# Patient Record
Sex: FEMALE | Race: OTHER
[De-identification: names, ages, dates, MRNs, and addresses within clinical notes are randomized per-mention and may not be internally consistent; named-entity substitution may affect disease eponyms.]

---

## 2017-01-05 ENCOUNTER — HOSPITAL ENCOUNTER (OUTPATIENT)
Dept: HOSPITAL 47 - LABWHC1 | Age: 62
Discharge: HOME | End: 2017-01-05
Payer: SELF-PAY

## 2017-01-05 DIAGNOSIS — R94.5: ICD-10-CM

## 2017-01-05 DIAGNOSIS — D56.9: ICD-10-CM

## 2017-01-05 DIAGNOSIS — E03.9: Primary | ICD-10-CM

## 2017-01-05 DIAGNOSIS — D50.8: ICD-10-CM

## 2017-01-05 LAB
IRON SERPL-MCNC: 139 UG/DL (ref 37–170)
TIBC SERPL-MCNC: 279 UG/DL (ref 265–497)

## 2017-01-05 PROCEDURE — 84439 ASSAY OF FREE THYROXINE: CPT

## 2017-01-05 PROCEDURE — 36415 COLL VENOUS BLD VENIPUNCTURE: CPT

## 2017-01-05 PROCEDURE — 83540 ASSAY OF IRON: CPT

## 2017-01-05 PROCEDURE — 83550 IRON BINDING TEST: CPT

## 2017-01-05 PROCEDURE — 82306 VITAMIN D 25 HYDROXY: CPT

## 2017-01-05 PROCEDURE — 84443 ASSAY THYROID STIM HORMONE: CPT

## 2017-01-05 PROCEDURE — 83021 HEMOGLOBIN CHROMOTOGRAPHY: CPT

## 2017-01-05 PROCEDURE — 82728 ASSAY OF FERRITIN: CPT

## 2017-02-15 ENCOUNTER — HOSPITAL ENCOUNTER (OUTPATIENT)
Dept: HOSPITAL 47 - LAB | Age: 62
Discharge: HOME | End: 2017-02-15
Payer: SELF-PAY

## 2017-02-15 DIAGNOSIS — E03.9: Primary | ICD-10-CM

## 2017-02-15 DIAGNOSIS — K21.9: ICD-10-CM

## 2017-02-15 LAB
CHOLEST SERPL-MCNC: 163 MG/DL (ref ?–200)
HDLC SERPL-MCNC: 40 MG/DL (ref 40–60)
TRIGL SERPL-MCNC: 196 MG/DL (ref ?–150)

## 2017-02-15 PROCEDURE — 86376 MICROSOMAL ANTIBODY EACH: CPT

## 2017-02-15 PROCEDURE — 80061 LIPID PANEL: CPT

## 2017-02-15 PROCEDURE — 84443 ASSAY THYROID STIM HORMONE: CPT

## 2017-02-15 PROCEDURE — 82306 VITAMIN D 25 HYDROXY: CPT

## 2017-02-15 PROCEDURE — 84439 ASSAY OF FREE THYROXINE: CPT

## 2017-02-15 PROCEDURE — 86800 THYROGLOBULIN ANTIBODY: CPT

## 2017-08-16 ENCOUNTER — HOSPITAL ENCOUNTER (EMERGENCY)
Dept: HOSPITAL 47 - EC | Age: 62
Discharge: HOME | End: 2017-08-16
Payer: COMMERCIAL

## 2017-08-16 VITALS
HEART RATE: 68 BPM | SYSTOLIC BLOOD PRESSURE: 130 MMHG | DIASTOLIC BLOOD PRESSURE: 76 MMHG | TEMPERATURE: 97.3 F | RESPIRATION RATE: 20 BRPM

## 2017-08-16 DIAGNOSIS — H60.91: Primary | ICD-10-CM

## 2017-08-16 DIAGNOSIS — R68.84: ICD-10-CM

## 2017-08-16 DIAGNOSIS — R51: ICD-10-CM

## 2017-08-16 PROCEDURE — 99282 EMERGENCY DEPT VISIT SF MDM: CPT

## 2017-08-16 NOTE — ED
ENT HPI





- General


Chief complaint: ENT


Stated complaint: ear pain


Time Seen by Provider: 08/16/17 19:50


Source: patient, family


Mode of arrival: ambulatory


Limitations: no limitations





- History of Present Illness


Initial comments: 


61 year-old female patient presents emergency department today for complaints 

of right ear pain.  Patient does have a language barrier and has a family 

 with her.  She agrees to let the family member interpret for her 

and denies need for phone translation at this time.   states that 

she has had right ear pain for the last 2 days.  Patient states that the pain 

is now radiating down into her jaw and into her head.  She denies any drainage 

from the ear.  Denies any decrease in hearing.  She denies any fever, chills, 

rash, cough, congestion, nasal congestion, or nasal drainage.  She denies any 

sore throat, chest pain, shortness of breath, dizziness, weakness, headache, 

abdominal pain, nausea, vomiting, or any difficulties with urination or bowel 

movements.








- Related Data


 Home Medications











 Medication  Instructions  Recorded  Confirmed


 


Naproxen Sodium [Aleve] 220 - 440 mg PO Q8H PRN 10/25/16 08/16/17








 Previous Rx's











 Medication  Instructions  Recorded


 


Hydrocodone/Acetaminophen [Norco 1 each PO Q6HR PRN #20 tab 10/25/16





5-325]  


 


Ibuprofen [Motrin] 600 mg PO Q6HR PRN #40 day 10/25/16


 


Amoxic-Pot Clav 875-125Mg 1 tab PO Q12HR #20 tablet 08/16/17





[Augmentin 875-125]  


 


Ciprofloxacin-Dexameth [Ciprodex 4 drops RIGHT EAR BID #15 ml 08/16/17





Otic Susp]  


 


Ibuprofen 800 mg PO TID PRN #30 tablet 08/16/17











 Allergies











Allergy/AdvReac Type Severity Reaction Status Date / Time


 


No Known Allergies Allergy   Verified 12/31/16 13:52














Review of Systems


ROS Statement: 


Those systems with pertinent positive or pertinent negative responses have been 

documented in the HPI.





ROS Other: All systems not noted in ROS Statement are negative.





Past Medical History


Past Medical History: Thyroid Disorder


Additional Past Medical History / Comment(s): STEROID INJECTION WITHIN 3 MOS,

POSTMENOPAUSAL>2 YRS.  pt does not speak english. obtained history from brother-

broken english


History of Any Multi-Drug Resistant Organisms: None Reported


Past Surgical History: Cholecystectomy


Past Anesthesia/Blood Transfusion Reactions: No Reported Reaction


Past Psychological History: No Psychological Hx Reported


Smoking Status: Never smoker


Past Alcohol Use History: None Reported


Past Drug Use History: None Reported





- Past Family History


  ** Mother


Family Medical History: No Reported History





  ** Father


Family Medical History: Diabetes Mellitus





General Exam


Limitations: no limitations


General appearance: alert, in no apparent distress


Head exam: Present: atraumatic, normocephalic, normal inspection


Eye exam: Present: normal appearance, PERRL, EOMI.  Absent: scleral icterus, 

conjunctival injection, periorbital swelling


ENT exam: Present: normal exam, normal oropharynx, mucous membranes moist, 

other (Right external auditory canal is erythematous, swollen, and tender to 

touch.  No drainage noted.  No occlusion.  One postauricular lymph node 

appreciated. No mastoid tenderness.).  Absent: TM's normal bilaterally (Left 

tympanic membrane is within normal limits.  Unable to visualize the right 

tympanic membrane due to soft wax occlusion.), normal external ear exam (

Patient has some erythema and swelling noted to the right tragus.)


Neck exam: Present: normal inspection.  Absent: tenderness, meningismus, 

lymphadenopathy


Respiratory exam: Present: normal lung sounds bilaterally.  Absent: respiratory 

distress, wheezes, rales, rhonchi, stridor


Cardiovascular Exam: Present: regular rate, normal rhythm, normal heart sounds.

  Absent: systolic murmur, diastolic murmur, rubs, gallop, clicks


GI/Abdominal exam: Present: soft, normal bowel sounds.  Absent: distended, 

tenderness, guarding, rebound, rigid


Neurological exam: Present: alert, oriented X3, CN II-XII intact


Psychiatric exam: Present: normal affect, normal mood


Skin exam: Present: warm, dry, intact, normal color.  Absent: rash





Course


 Vital Signs











  08/16/17





  19:48


 


Temperature 97.3 F L


 


Pulse Rate 68


 


Respiratory 20





Rate 


 


Blood Pressure 130/76


 


O2 Sat by Pulse 99





Oximetry 














Medical Decision Making





- Medical Decision Making


61 year-old female patient presented to emergency department today for 

evaluation of right ear pain.  Physical exam did reveal erythema, swelling, and 

tenderness to the right external auditory canal as well as the right tragus.  

Patient will be discharged with a prescription for Ciprodex drops to be 

instilled twice daily, as well as a perception of Augmentin as I was unable to 

visualize the right tympanic membrane.  Patient was given instructions to follow

-up with ears nose and throat specialist in 1-2 days for recheck.  Instructed 

to follow-up with her primary care physician for recheck in 1-2 days.  

Instructed to return here for any new, worsening, or concerning symptoms.  

Patient via the  verbalized understanding and agreed to this plan.








Disposition


Clinical Impression: 


 Otitis externa, Ear pain, right





Disposition: HOME SELF-CARE


Condition: Good


Instructions:  Otitis Externa (ED), Earache (ED)


Additional Instructions: 


Make appointmentn with ears nose and throat specialist.  Take medications as 

directed.  Follow-up with primary care physician for recheck in 1-2 days.  

Return immediately for any new, worsening, or concerning symptoms.


Prescriptions: 


Amoxic-Pot Clav 875-125Mg [Augmentin 875-125] 1 tab PO Q12HR #20 tablet


Ciprofloxacin-Dexameth [Ciprodex Otic Susp] 4 drops RIGHT EAR BID #15 ml


Ibuprofen 800 mg PO TID PRN #30 tablet


 PRN Reason: Pain


Referrals: 


Christian Art MD [Primary Care Provider] - 1-2 days


Kedar Murillo MD [STAFF PHYSICIAN] - 1-2 days


Time of Disposition: 20:30

## 2017-10-12 ENCOUNTER — HOSPITAL ENCOUNTER (EMERGENCY)
Dept: HOSPITAL 47 - EC | Age: 62
Discharge: HOME | End: 2017-10-12
Payer: COMMERCIAL

## 2017-10-12 VITALS
SYSTOLIC BLOOD PRESSURE: 111 MMHG | DIASTOLIC BLOOD PRESSURE: 77 MMHG | TEMPERATURE: 99.3 F | RESPIRATION RATE: 18 BRPM | HEART RATE: 66 BPM

## 2017-10-12 DIAGNOSIS — K08.89: Primary | ICD-10-CM

## 2017-10-12 PROCEDURE — 99282 EMERGENCY DEPT VISIT SF MDM: CPT

## 2017-10-12 NOTE — ED
ENT HPI





- General


Chief complaint: Dental/Oral


Stated complaint: Dental


Time Seen by Provider: 10/12/17 12:12


Source: patient


Mode of arrival: wheelchair


Limitations: language barrier





- History of Present Illness


Initial comments: 


This is a 62-year-old female who presents to the emergency department with 

chief complaint of dental pain.  Patient does not speak English.  There is a 

family member at the bedside who provides history and translates.  Family 

member states that last week patient saw a dentist in [place] and had tooth #10 

removed.  This week patient has been complaining of pain at tooth #11. The pain 

is localized. Pain is constant ache and becomes sharp with touch, air and cold 

drinks. Denies drainage or discharge.  Denies fever, chills, chest pain, 

shortness of breath, abdominal pain, nausea or vomiting, headache or vision 

changes.  








- Related Data


 Home Medications











 Medication  Instructions  Recorded  Confirmed


 


Naproxen Sodium [Aleve] 220 - 440 mg PO Q8H PRN 10/25/16 08/16/17








 Previous Rx's











 Medication  Instructions  Recorded


 


Hydrocodone/Acetaminophen [Norco 1 each PO Q6HR PRN #20 tab 10/25/16





5-325]  


 


Ibuprofen [Motrin] 600 mg PO Q6HR PRN #40 day 10/25/16


 


Amoxic-Pot Clav 875-125Mg 1 tab PO Q12HR #20 tablet 08/16/17





[Augmentin 875-125]  


 


Ciprofloxacin-Dexameth [Ciprodex 4 drops RIGHT EAR BID #15 ml 08/16/17





Otic Susp]  


 


Ibuprofen 800 mg PO TID PRN #30 tablet 08/16/17


 


HYDROcodone/APAP 5-325MG [Norco 5] 1 each PO Q6HR PRN #20 tab 10/12/17


 


Ibuprofen [Motrin] 600 mg PO Q6HR PRN #30 tab 10/12/17


 


Penicillin V Potassium 500 mg PO QID 10 Days 10/12/17











 Allergies











Allergy/AdvReac Type Severity Reaction Status Date / Time


 


No Known Allergies Allergy   Verified 12/31/16 13:52














Review of Systems


ROS Statement: 


Those systems with pertinent positive or pertinent negative responses have been 

documented in the HPI.





ROS Other: All systems not noted in ROS Statement are negative.





Past Medical History


Past Medical History: Thyroid Disorder


Additional Past Medical History / Comment(s): STEROID INJECTION WITHIN 3 MOS,

POSTMENOPAUSAL>2 YRS.  pt does not speak english. obtained history from brother-

broken english


History of Any Multi-Drug Resistant Organisms: None Reported


Past Surgical History: Cholecystectomy


Past Anesthesia/Blood Transfusion Reactions: No Reported Reaction


Past Psychological History: No Psychological Hx Reported


Smoking Status: Never smoker


Past Alcohol Use History: None Reported


Past Drug Use History: None Reported





- Past Family History


  ** Mother


Family Medical History: No Reported History





  ** Father


Family Medical History: Diabetes Mellitus





General Exam





- General Exam Comments


Initial Comments: 





General: Awake and alert, well-developed; appears uncomfortable.  Family 

members at bedside.


HEENT: Head atraumatic, normocephalic. Pupils are equal, round and reactive to 

light. Extraocular movements intact. Oropharynx moist without erythema or 

exudate.  Dentition is poor throughout.  Tooth #10 has been extracted.  Gumline 

of tooth #11 is erythematous and tender to touch.  It appears she has 

periodontal disease as the gumline is recessed.  No absence or fluctuance noted.


Neck: Supple. Normal ROM. 


Cardiovascular: Regular rate and rhythm. No murmurs, rubs or gallops. Chest 

symmetrical.  


Respiratory: Lungs clear to auscultation bilaterally. No wheezes, rales or 

rhonchi. Normal respiratory effort with no use of accessory muscles. 


Skin: Pink, warm and dry without rashes or lesions. 


Neurological: Alert and oriented x3.. CN II-XII grossly intact. No focal neuro 

deficits. 


Psychiatric: Normal mood and affect. No overt signs of depression or anxiety 

noted. 











Limitations: language barrier





Course


 Vital Signs











  10/12/17





  11:43


 


Temperature 99.3 F


 


Pulse Rate 66


 


Respiratory 18





Rate 


 


Blood Pressure 111/77


 


O2 Sat by Pulse 97





Oximetry 














Medical Decision Making





- Medical Decision Making





This case was discussed with attending physician, Dr. Benitez.  Patient will be 

discharged home with prescription for antibiotic and pain medications.  She was 

advised to follow up with a dentist as soon as possible.





Disposition


Clinical Impression: 


 Pain, dental





Disposition: HOME SELF-CARE


Condition: Good


Instructions:  Toothache (ED), Dental Caries (ED)


Additional Instructions: 


Please take medications as prescribed. Please follow up with a dentist as soon 

as possible.  Return to emergency department if symptoms should worsen or any 

concerns arise. 


Prescriptions: 


HYDROcodone/APAP 5-325MG [Norco 5] 1 each PO Q6HR PRN #20 tab


 PRN Reason: Pain


Ibuprofen [Motrin] 600 mg PO Q6HR PRN #30 tab


 PRN Reason: Pain


Penicillin V Potassium 500 mg PO QID 10 Days


Referrals: 


Christian Art MD [Primary Care Provider] - 1-2 days


Time of Disposition: 12:33

## 2018-01-12 ENCOUNTER — HOSPITAL ENCOUNTER (OUTPATIENT)
Dept: HOSPITAL 47 - LABWHC1 | Age: 63
Discharge: HOME | End: 2018-01-12
Payer: COMMERCIAL

## 2018-01-12 DIAGNOSIS — E55.9: ICD-10-CM

## 2018-01-12 DIAGNOSIS — E06.3: ICD-10-CM

## 2018-01-12 DIAGNOSIS — K75.9: ICD-10-CM

## 2018-01-12 DIAGNOSIS — E78.5: Primary | ICD-10-CM

## 2018-01-12 DIAGNOSIS — E03.9: ICD-10-CM

## 2018-01-12 LAB
ALBUMIN SERPL-MCNC: 4.5 G/DL (ref 3.5–5)
ALP SERPL-CCNC: 78 U/L (ref 38–126)
ALT SERPL-CCNC: 54 U/L (ref 9–52)
ANION GAP SERPL CALC-SCNC: 11 MMOL/L
AST SERPL-CCNC: 35 U/L (ref 14–36)
BASOPHILS # BLD AUTO: 0 K/UL (ref 0–0.2)
BASOPHILS NFR BLD AUTO: 1 %
BUN SERPL-SCNC: 17 MG/DL (ref 7–17)
CALCIUM SPEC-MCNC: 9.7 MG/DL (ref 8.4–10.2)
CHLORIDE SERPL-SCNC: 106 MMOL/L (ref 98–107)
CHOLEST SERPL-MCNC: 197 MG/DL (ref ?–200)
CK SERPL-CCNC: 44 U/L (ref 30–135)
CO2 SERPL-SCNC: 25 MMOL/L (ref 22–30)
EOSINOPHIL # BLD AUTO: 0.1 K/UL (ref 0–0.7)
EOSINOPHIL NFR BLD AUTO: 3 %
ERYTHROCYTE [DISTWIDTH] IN BLOOD BY AUTOMATED COUNT: 5.23 M/UL (ref 3.8–5.4)
ERYTHROCYTE [DISTWIDTH] IN BLOOD: 13.7 % (ref 11.5–15.5)
GLUCOSE SERPL-MCNC: 117 MG/DL (ref 74–99)
HBA1C MFR BLD: 6.1 % (ref 4–6)
HCT VFR BLD AUTO: 40.3 % (ref 34–46)
HDLC SERPL-MCNC: 48 MG/DL (ref 40–60)
HGB BLD-MCNC: 12 GM/DL (ref 11.4–16)
LDLC SERPL CALC-MCNC: 128 MG/DL (ref 0–99)
LYMPHOCYTES # SPEC AUTO: 1.7 K/UL (ref 1–4.8)
LYMPHOCYTES NFR SPEC AUTO: 44 %
MCH RBC QN AUTO: 22.8 PG (ref 25–35)
MCHC RBC AUTO-ENTMCNC: 29.6 G/DL (ref 31–37)
MCV RBC AUTO: 77.1 FL (ref 80–100)
MONOCYTES # BLD AUTO: 0.2 K/UL (ref 0–1)
MONOCYTES NFR BLD AUTO: 6 %
NEUTROPHILS # BLD AUTO: 1.7 K/UL (ref 1.3–7.7)
NEUTROPHILS NFR BLD AUTO: 44 %
PLATELET # BLD AUTO: 250 K/UL (ref 150–450)
POTASSIUM SERPL-SCNC: 4.5 MMOL/L (ref 3.5–5.1)
PROT SERPL-MCNC: 8.5 G/DL (ref 6.3–8.2)
SODIUM SERPL-SCNC: 142 MMOL/L (ref 137–145)
T4 FREE SERPL-MCNC: 1.28 NG/DL (ref 0.78–2.19)
TRIGL SERPL-MCNC: 105 MG/DL (ref ?–150)
WBC # BLD AUTO: 3.8 K/UL (ref 3.8–10.6)

## 2018-01-12 PROCEDURE — 86140 C-REACTIVE PROTEIN: CPT

## 2018-01-12 PROCEDURE — 83036 HEMOGLOBIN GLYCOSYLATED A1C: CPT

## 2018-01-12 PROCEDURE — 36415 COLL VENOUS BLD VENIPUNCTURE: CPT

## 2018-01-12 PROCEDURE — 84439 ASSAY OF FREE THYROXINE: CPT

## 2018-01-12 PROCEDURE — 86376 MICROSOMAL ANTIBODY EACH: CPT

## 2018-01-12 PROCEDURE — 82306 VITAMIN D 25 HYDROXY: CPT

## 2018-01-12 PROCEDURE — 85025 COMPLETE CBC W/AUTO DIFF WBC: CPT

## 2018-01-12 PROCEDURE — 85652 RBC SED RATE AUTOMATED: CPT

## 2018-01-12 PROCEDURE — 80074 ACUTE HEPATITIS PANEL: CPT

## 2018-01-12 PROCEDURE — 80053 COMPREHEN METABOLIC PANEL: CPT

## 2018-01-12 PROCEDURE — 82550 ASSAY OF CK (CPK): CPT

## 2018-01-12 PROCEDURE — 84443 ASSAY THYROID STIM HORMONE: CPT

## 2018-01-12 PROCEDURE — 86800 THYROGLOBULIN ANTIBODY: CPT

## 2018-01-12 PROCEDURE — 80061 LIPID PANEL: CPT

## 2018-03-12 ENCOUNTER — HOSPITAL ENCOUNTER (EMERGENCY)
Dept: HOSPITAL 47 - EC | Age: 63
Discharge: HOME | End: 2018-03-12
Payer: COMMERCIAL

## 2018-03-12 VITALS
RESPIRATION RATE: 17 BRPM | TEMPERATURE: 97.7 F | DIASTOLIC BLOOD PRESSURE: 70 MMHG | SYSTOLIC BLOOD PRESSURE: 119 MMHG | HEART RATE: 74 BPM

## 2018-03-12 DIAGNOSIS — Z90.49: ICD-10-CM

## 2018-03-12 DIAGNOSIS — R50.9: ICD-10-CM

## 2018-03-12 DIAGNOSIS — Z79.899: ICD-10-CM

## 2018-03-12 DIAGNOSIS — K52.9: Primary | ICD-10-CM

## 2018-03-12 DIAGNOSIS — E07.9: ICD-10-CM

## 2018-03-12 LAB
ALBUMIN SERPL-MCNC: 4.4 G/DL (ref 3.5–5)
ALP SERPL-CCNC: 68 U/L (ref 38–126)
ALT SERPL-CCNC: 42 U/L (ref 9–52)
AMYLASE SERPL-CCNC: 72 U/L (ref 30–110)
ANION GAP SERPL CALC-SCNC: 11 MMOL/L
APTT BLD: 22.1 SEC (ref 22–30)
AST SERPL-CCNC: 27 U/L (ref 14–36)
BASOPHILS # BLD AUTO: 0 K/UL (ref 0–0.2)
BASOPHILS NFR BLD AUTO: 0 %
BUN SERPL-SCNC: 15 MG/DL (ref 7–17)
CALCIUM SPEC-MCNC: 9.6 MG/DL (ref 8.4–10.2)
CHLORIDE SERPL-SCNC: 105 MMOL/L (ref 98–107)
CO2 SERPL-SCNC: 26 MMOL/L (ref 22–30)
EOSINOPHIL # BLD AUTO: 0 K/UL (ref 0–0.7)
EOSINOPHIL NFR BLD AUTO: 1 %
ERYTHROCYTE [DISTWIDTH] IN BLOOD BY AUTOMATED COUNT: 5.33 M/UL (ref 3.8–5.4)
ERYTHROCYTE [DISTWIDTH] IN BLOOD: 13.9 % (ref 11.5–15.5)
GLUCOSE SERPL-MCNC: 93 MG/DL (ref 74–99)
HCT VFR BLD AUTO: 39.5 % (ref 34–46)
HGB BLD-MCNC: 12.2 GM/DL (ref 11.4–16)
INR PPP: 1 (ref ?–1.2)
LIPASE SERPL-CCNC: 121 U/L (ref 23–300)
LYMPHOCYTES # SPEC AUTO: 1.7 K/UL (ref 1–4.8)
LYMPHOCYTES NFR SPEC AUTO: 34 %
MCH RBC QN AUTO: 22.9 PG (ref 25–35)
MCHC RBC AUTO-ENTMCNC: 30.9 G/DL (ref 31–37)
MCV RBC AUTO: 74.2 FL (ref 80–100)
MONOCYTES # BLD AUTO: 0.3 K/UL (ref 0–1)
MONOCYTES NFR BLD AUTO: 6 %
NEUTROPHILS # BLD AUTO: 2.7 K/UL (ref 1.3–7.7)
NEUTROPHILS NFR BLD AUTO: 56 %
PH UR: 7 [PH] (ref 5–8)
PLATELET # BLD AUTO: 279 K/UL (ref 150–450)
POTASSIUM SERPL-SCNC: 4.7 MMOL/L (ref 3.5–5.1)
PROT SERPL-MCNC: 8.3 G/DL (ref 6.3–8.2)
PT BLD: 9.7 SEC (ref 9–12)
SODIUM SERPL-SCNC: 142 MMOL/L (ref 137–145)
SP GR UR: 1.02 (ref 1–1.03)
UROBILINOGEN UR QL STRIP: <2 MG/DL (ref ?–2)
WBC # BLD AUTO: 4.9 K/UL (ref 3.8–10.6)

## 2018-03-12 PROCEDURE — 83690 ASSAY OF LIPASE: CPT

## 2018-03-12 PROCEDURE — 85730 THROMBOPLASTIN TIME PARTIAL: CPT

## 2018-03-12 PROCEDURE — 85610 PROTHROMBIN TIME: CPT

## 2018-03-12 PROCEDURE — 80053 COMPREHEN METABOLIC PANEL: CPT

## 2018-03-12 PROCEDURE — 82150 ASSAY OF AMYLASE: CPT

## 2018-03-12 PROCEDURE — 96361 HYDRATE IV INFUSION ADD-ON: CPT

## 2018-03-12 PROCEDURE — 99284 EMERGENCY DEPT VISIT MOD MDM: CPT

## 2018-03-12 PROCEDURE — 87502 INFLUENZA DNA AMP PROBE: CPT

## 2018-03-12 PROCEDURE — 74177 CT ABD & PELVIS W/CONTRAST: CPT

## 2018-03-12 PROCEDURE — 96375 TX/PRO/DX INJ NEW DRUG ADDON: CPT

## 2018-03-12 PROCEDURE — 81003 URINALYSIS AUTO W/O SCOPE: CPT

## 2018-03-12 PROCEDURE — 96374 THER/PROPH/DIAG INJ IV PUSH: CPT

## 2018-03-12 PROCEDURE — 85025 COMPLETE CBC W/AUTO DIFF WBC: CPT

## 2018-03-12 PROCEDURE — 87040 BLOOD CULTURE FOR BACTERIA: CPT

## 2018-03-12 PROCEDURE — 36415 COLL VENOUS BLD VENIPUNCTURE: CPT

## 2018-03-12 PROCEDURE — 83605 ASSAY OF LACTIC ACID: CPT

## 2018-03-12 NOTE — ED
Abdominal Pain HPI





- General


Chief Complaint: Abdominal Pain


Stated Complaint: Abd Pain, Headache


Time Seen by Provider: 03/12/18 13:30


Source: patient, family, RN notes reviewed


Mode of arrival: ambulatory


Limitations: language barrier





- History of Present Illness


Initial Comments: 





This is a 62-year-old female presents emergency Department with chief complaint 

of abdominal pain for last 3 days.  Patient went of epigastric discomfort to 

midabdominal pain.  She denies chest pain, shortness breath, diarrhea 

constipation.  She's had a prior cholecystectomy.  Patient states that she has 

a headache but seems to come and go.  Denies any dizziness, ear pain, sore 

throat no cough or chest congestion.  Patient states the pain in her abdomen 

only radiates down does radiate to her back.  She has no dysuria no hematuria.





- Related Data


 Home Medications











 Medication  Instructions  Recorded  Confirmed


 


Levothyroxine Sodium 100 mcg PO DAILY 03/12/18 03/12/18








 Previous Rx's











 Medication  Instructions  Recorded


 


Ondansetron Odt [Zofran Odt] 4 mg PO Q8HR PRN #10 tab 03/12/18


 


traMADol HCl [Ultram] 50 mg PO Q6H PRN #20 tab 03/12/18











 Allergies











Allergy/AdvReac Type Severity Reaction Status Date / Time


 


No Known Allergies Allergy   Verified 03/12/18 13:36














Review of Systems


ROS Statement: 


Those systems with pertinent positive or pertinent negative responses have been 

documented in the HPI.





ROS Other: All systems not noted in ROS Statement are negative.





Past Medical History


Past Medical History: Thyroid Disorder


Additional Past Medical History / Comment(s): STEROID INJECTION WITHIN 3 MOS,

POSTMENOPAUSAL>2 YRS.  pt does not speak english. obtained history from brother-

broken english


History of Any Multi-Drug Resistant Organisms: None Reported


Past Surgical History: Cholecystectomy


Past Anesthesia/Blood Transfusion Reactions: No Reported Reaction


Past Psychological History: No Psychological Hx Reported


Smoking Status: Never smoker


Past Alcohol Use History: None Reported


Past Drug Use History: None Reported





- Past Family History


  ** Mother


Family Medical History: No Reported History





  ** Father


Family Medical History: Diabetes Mellitus





General Exam


Limitations: language barrier


General appearance: alert, in no apparent distress


Head exam: Present: atraumatic, normocephalic, normal inspection


Eye exam: Present: normal appearance, PERRL, EOMI.  Absent: scleral icterus, 

conjunctival injection, periorbital swelling


ENT exam: Present: normal exam, normal oropharynx, mucous membranes moist, TM's 

normal bilaterally, normal external ear exam


Neck exam: Present: normal inspection, full ROM.  Absent: tenderness, 

meningismus, lymphadenopathy


Respiratory exam: Present: normal lung sounds bilaterally.  Absent: respiratory 

distress, wheezes, rales, rhonchi, stridor


Cardiovascular Exam: Present: regular rate, normal rhythm, normal heart sounds.

  Absent: systolic murmur, diastolic murmur, rubs, gallop, clicks


GI/Abdominal exam: Present: soft, tenderness (Mild epigastric), normal bowel 

sounds.  Absent: distended, guarding, rebound, rigid


Back exam: Absent: CVA tenderness (R), CVA tenderness (L)


Skin exam: Present: warm, dry, intact, normal color.  Absent: rash





Course


 Vital Signs











  03/12/18





  13:00


 


Temperature 100.1 F H


 


Pulse Rate 82


 


Respiratory 18





Rate 


 


Blood Pressure 109/71


 


O2 Sat by Pulse 96





Oximetry 














- Reevaluation(s)


Reevaluation #1: 





03/12/18 15:11


Patient was updated and results stated that her headache is resolved though she 

still had some abdominal discomfort.  Patient we given Toradol.





Medical Decision Making





- Medical Decision Making





62-year-old female presented emergency department with multiple complaints.  

Primary complaint was abdominal pain.  Patient lab work and CT secondary to 

fever.  Patient has enteritis type changes.  Patient did complain of a headache 

though resolved after acetaminophen and IV fluids.  Patient no neck pain or 

neck stiffness.  Patient was updated on lab results which essentially 

unremarkable.  Patient will be discharged with  antiemetics and tramadol





- Lab Data


Result diagrams: 


 03/12/18 13:43





 03/12/18 13:43


 Lab Results











  03/12/18 03/12/18 03/12/18 Range/Units





  13:00 13:43 13:43 


 


WBC    4.9  (3.8-10.6)  k/uL


 


RBC    5.33  (3.80-5.40)  m/uL


 


Hgb    12.2  (11.4-16.0)  gm/dL


 


Hct    39.5  (34.0-46.0)  %


 


MCV    74.2 L  (80.0-100.0)  fL


 


MCH    22.9 L  (25.0-35.0)  pg


 


MCHC    30.9 L  (31.0-37.0)  g/dL


 


RDW    13.9  (11.5-15.5)  %


 


Plt Count    279  (150-450)  k/uL


 


Neutrophils %    56  %


 


Lymphocytes %    34  %


 


Monocytes %    6  %


 


Eosinophils %    1  %


 


Basophils %    0  %


 


Neutrophils #    2.7  (1.3-7.7)  k/uL


 


Lymphocytes #    1.7  (1.0-4.8)  k/uL


 


Monocytes #    0.3  (0-1.0)  k/uL


 


Eosinophils #    0.0  (0-0.7)  k/uL


 


Basophils #    0.0  (0-0.2)  k/uL


 


Hypochromasia    Slight  


 


Microcytosis    Slight  


 


PT     (9.0-12.0)  sec


 


INR     (<1.2)  


 


APTT     (22.0-30.0)  sec


 


Sodium   142   (137-145)  mmol/L


 


Potassium   4.7   (3.5-5.1)  mmol/L


 


Chloride   105   ()  mmol/L


 


Carbon Dioxide   26   (22-30)  mmol/L


 


Anion Gap   11   mmol/L


 


BUN   15   (7-17)  mg/dL


 


Creatinine   0.60   (0.52-1.04)  mg/dL


 


Est GFR (CKD-EPI)AfAm   >90   (>60 ml/min/1.73 sqM)  


 


Est GFR (CKD-EPI)NonAf   >90   (>60 ml/min/1.73 sqM)  


 


Glucose   93   (74-99)  mg/dL


 


Plasma Lactic Acid Fred     (0.7-2.0)  mmol/L


 


Calcium   9.6   (8.4-10.2)  mg/dL


 


Total Bilirubin   0.2   (0.2-1.3)  mg/dL


 


AST   27   (14-36)  U/L


 


ALT   42   (9-52)  U/L


 


Alkaline Phosphatase   68   ()  U/L


 


Total Protein   8.3 H   (6.3-8.2)  g/dL


 


Albumin   4.4   (3.5-5.0)  g/dL


 


Amylase   72   ()  U/L


 


Lipase   121   ()  U/L


 


Urine Color  Yellow    


 


Urine Appearance  Clear    (Clear)  


 


Urine pH  7.0    (5.0-8.0)  


 


Ur Specific Gravity  1.020    (1.001-1.035)  


 


Urine Protein  Trace H    (Negative)  


 


Urine Glucose (UA)  Negative    (Negative)  


 


Urine Ketones  Negative    (Negative)  


 


Urine Blood  Negative    (Negative)  


 


Urine Nitrite  Negative    (Negative)  


 


Urine Bilirubin  Negative    (Negative)  


 


Urine Urobilinogen  <2.0    (<2.0)  mg/dL


 


Ur Leukocyte Esterase  Negative    (Negative)  


 


Influenza Type A RNA     (Not Detectd)  


 


Influenza Type B (PCR)     (Not Detectd)  














  03/12/18 03/12/18 03/12/18 Range/Units





  13:43 13:43 13:43 


 


WBC     (3.8-10.6)  k/uL


 


RBC     (3.80-5.40)  m/uL


 


Hgb     (11.4-16.0)  gm/dL


 


Hct     (34.0-46.0)  %


 


MCV     (80.0-100.0)  fL


 


MCH     (25.0-35.0)  pg


 


MCHC     (31.0-37.0)  g/dL


 


RDW     (11.5-15.5)  %


 


Plt Count     (150-450)  k/uL


 


Neutrophils %     %


 


Lymphocytes %     %


 


Monocytes %     %


 


Eosinophils %     %


 


Basophils %     %


 


Neutrophils #     (1.3-7.7)  k/uL


 


Lymphocytes #     (1.0-4.8)  k/uL


 


Monocytes #     (0-1.0)  k/uL


 


Eosinophils #     (0-0.7)  k/uL


 


Basophils #     (0-0.2)  k/uL


 


Hypochromasia     


 


Microcytosis     


 


PT   9.7   (9.0-12.0)  sec


 


INR   1.0   (<1.2)  


 


APTT   22.1   (22.0-30.0)  sec


 


Sodium     (137-145)  mmol/L


 


Potassium     (3.5-5.1)  mmol/L


 


Chloride     ()  mmol/L


 


Carbon Dioxide     (22-30)  mmol/L


 


Anion Gap     mmol/L


 


BUN     (7-17)  mg/dL


 


Creatinine     (0.52-1.04)  mg/dL


 


Est GFR (CKD-EPI)AfAm     (>60 ml/min/1.73 sqM)  


 


Est GFR (CKD-EPI)NonAf     (>60 ml/min/1.73 sqM)  


 


Glucose     (74-99)  mg/dL


 


Plasma Lactic Acid Fred  1.1    (0.7-2.0)  mmol/L


 


Calcium     (8.4-10.2)  mg/dL


 


Total Bilirubin     (0.2-1.3)  mg/dL


 


AST     (14-36)  U/L


 


ALT     (9-52)  U/L


 


Alkaline Phosphatase     ()  U/L


 


Total Protein     (6.3-8.2)  g/dL


 


Albumin     (3.5-5.0)  g/dL


 


Amylase     ()  U/L


 


Lipase     ()  U/L


 


Urine Color     


 


Urine Appearance     (Clear)  


 


Urine pH     (5.0-8.0)  


 


Ur Specific Gravity     (1.001-1.035)  


 


Urine Protein     (Negative)  


 


Urine Glucose (UA)     (Negative)  


 


Urine Ketones     (Negative)  


 


Urine Blood     (Negative)  


 


Urine Nitrite     (Negative)  


 


Urine Bilirubin     (Negative)  


 


Urine Urobilinogen     (<2.0)  mg/dL


 


Ur Leukocyte Esterase     (Negative)  


 


Influenza Type A RNA    Not Detected  (Not Detectd)  


 


Influenza Type B (PCR)    Not Detected  (Not Detectd)  














Disposition


Clinical Impression: 


 Abdominal pain, Enteritis





Disposition: HOME SELF-CARE


Condition: Stable


Instructions:  Abdominal Pain (ED)


Additional Instructions: 


Please return to the Emergency Department if symptoms worsen or any other 

concerns.


Prescriptions: 


Ondansetron Odt [Zofran Odt] 4 mg PO Q8HR PRN #10 tab


 PRN Reason: Nausea


traMADol HCl [Ultram] 50 mg PO Q6H PRN #20 tab


 PRN Reason: Pain


Referrals: 


Christian Art MD [Primary Care Provider] - 1-2 days


Time of Disposition: 15:13

## 2018-03-12 NOTE — CT
EXAMINATION TYPE: CT abdomen pelvis w con

 

DATE OF EXAM: 3/12/2018

 

COMPARISON: NONE

 

HISTORY: 62-year-old female with abdominal pain

 

TECHNIQUE: Contiguous axial scanning of the abdomen and pelvis following administration of 100 ml Omn
ipaque 300 IV contrast.  Delayed images through the kidneys and coronal/sagittal reconstructions perf
ormed.

 

CT DLP: 1598 mGycm

Automated exposure control for dose reduction was used.

 

 

FINDINGS:

Heart is normal size without pericardial effusion. Focal pleural parenchymal thickening measuring 1.7
 x 0.6 cm along the inferior lingula should be reassessed at follow-up. Possible focal area of subple
ural atelectasis. Follow-up recommended to reassess this area. No pleural effusion.

 

Liver enlarged at 19.1 cm craniocaudal. Portal venous system is patent. No biliary ductal dilatation 
seen.

 

Gallbladder surgically absent.

 

Adrenal glands, kidneys, spleen, and pancreas appear within normal limits.

 

A few scattered borderline sized mesenteric lymph nodes measure up to 6 mm.

 

Some prominent fluid-filled small bowel loops in the mid to lower abdomen.

 

Normal appendix. Scattered mild stool. No pericolonic inflammatory change.

 

Bladder partially distended. Uterus and left ovary is visualized. Right ovary obscured by adjacent aydin
wel loops. No abnormal fluid collection in the pelvis or pelvic lymphadenopathy seen.

 

Bones: No osseous destructive process.

 

Prominent breathing motion artifacts.

 

 

 

 

 

IMPRESSION: 

 

1. SOME PROMINENT FLUID-FILLED SMALL BOWEL LOOPS IN THE MID TO LOWER ABDOMEN. CORRELATE FOR POSSIBLE 
ENTERITIS.

2. MILD HEPATOMEGALY (19.1 CM).

3. FOCAL PLEURAL PARENCHYMAL THICKENING MEASURING 1.7 X 0.6 CM LONG THE INFERIOR LINGULA COULD REPRES
ENT AN AREA OF SUBPLEURAL ATELECTASIS. RECOMMEND THREE-MONTH FOLLOW-UP CT CHEST TO REASSESS.

## 2018-03-16 ENCOUNTER — HOSPITAL ENCOUNTER (OUTPATIENT)
Dept: HOSPITAL 47 - LABWHC1 | Age: 63
Discharge: HOME | End: 2018-03-16
Payer: COMMERCIAL

## 2018-03-16 DIAGNOSIS — E03.9: Primary | ICD-10-CM

## 2018-03-16 LAB — T4 FREE SERPL-MCNC: 1.07 NG/DL (ref 0.78–2.19)

## 2018-03-16 PROCEDURE — 84443 ASSAY THYROID STIM HORMONE: CPT

## 2018-03-16 PROCEDURE — 84439 ASSAY OF FREE THYROXINE: CPT

## 2018-03-16 PROCEDURE — 86376 MICROSOMAL ANTIBODY EACH: CPT

## 2018-03-16 PROCEDURE — 86800 THYROGLOBULIN ANTIBODY: CPT

## 2018-03-16 PROCEDURE — 36415 COLL VENOUS BLD VENIPUNCTURE: CPT

## 2018-03-29 ENCOUNTER — HOSPITAL ENCOUNTER (OUTPATIENT)
Dept: HOSPITAL 47 - RADCTMAIN | Age: 63
Discharge: HOME | End: 2018-03-29
Payer: COMMERCIAL

## 2018-03-29 DIAGNOSIS — G43.909: Primary | ICD-10-CM

## 2018-03-29 PROCEDURE — 70460 CT HEAD/BRAIN W/DYE: CPT

## 2018-03-29 NOTE — CT
EXAMINATION TYPE: CT brain w con

 

DATE OF EXAM: 3/29/2018

 

COMPARISON: Noncontrast study 10/25/2014

 

HISTORY: 62-year-old female with Migraines

 

CT DLP: 1036.0 mGycm

Automated exposure control for dose reduction was used.

 

Technique: CT scan of the head is performed with IV Contrast, patient injected with 100 ml mL of Isov
ue 300. Coronal and sagittal reconstructions performed.

 

FINDINGS: 

There is no abnormal enhancing mass or midline shift identified.  The ventricles and sulci are within
 normal limits in size.  The globes are intact and the visualized sinuses are clear.

 

IMPRESSION: 

Negative contrast enhanced head CT exam. If there is a history of severe migraines and there is reynold
rn for secondary white matter changes, MRI can be considered to assess for areas of ischemic demyelin
ation.

## 2018-07-06 ENCOUNTER — HOSPITAL ENCOUNTER (EMERGENCY)
Dept: HOSPITAL 47 - EC | Age: 63
Discharge: HOME | End: 2018-07-06
Payer: COMMERCIAL

## 2018-07-06 VITALS
HEART RATE: 61 BPM | RESPIRATION RATE: 16 BRPM | SYSTOLIC BLOOD PRESSURE: 132 MMHG | DIASTOLIC BLOOD PRESSURE: 75 MMHG | TEMPERATURE: 98 F

## 2018-07-06 DIAGNOSIS — K59.00: Primary | ICD-10-CM

## 2018-07-06 DIAGNOSIS — Z79.899: ICD-10-CM

## 2018-07-06 DIAGNOSIS — Z90.49: ICD-10-CM

## 2018-07-06 DIAGNOSIS — E07.9: ICD-10-CM

## 2018-07-06 LAB
ALBUMIN SERPL-MCNC: 4.2 G/DL (ref 3.5–5)
ALP SERPL-CCNC: 59 U/L (ref 38–126)
ALT SERPL-CCNC: 41 U/L (ref 9–52)
ANION GAP SERPL CALC-SCNC: 14 MMOL/L
AST SERPL-CCNC: 29 U/L (ref 14–36)
BASOPHILS # BLD AUTO: 0 K/UL (ref 0–0.2)
BASOPHILS NFR BLD AUTO: 1 %
BUN SERPL-SCNC: 13 MG/DL (ref 7–17)
CALCIUM SPEC-MCNC: 9 MG/DL (ref 8.4–10.2)
CHLORIDE SERPL-SCNC: 104 MMOL/L (ref 98–107)
CO2 SERPL-SCNC: 21 MMOL/L (ref 22–30)
EOSINOPHIL # BLD AUTO: 0.2 K/UL (ref 0–0.7)
EOSINOPHIL NFR BLD AUTO: 4 %
ERYTHROCYTE [DISTWIDTH] IN BLOOD BY AUTOMATED COUNT: 5.01 M/UL (ref 3.8–5.4)
ERYTHROCYTE [DISTWIDTH] IN BLOOD: 14.9 % (ref 11.5–15.5)
GLUCOSE SERPL-MCNC: 110 MG/DL (ref 74–99)
HCT VFR BLD AUTO: 37.9 % (ref 34–46)
HGB BLD-MCNC: 11.8 GM/DL (ref 11.4–16)
LIPASE SERPL-CCNC: 137 U/L (ref 23–300)
LYMPHOCYTES # SPEC AUTO: 1.2 K/UL (ref 1–4.8)
LYMPHOCYTES NFR SPEC AUTO: 34 %
MCH RBC QN AUTO: 23.5 PG (ref 25–35)
MCHC RBC AUTO-ENTMCNC: 31 G/DL (ref 31–37)
MCV RBC AUTO: 75.6 FL (ref 80–100)
MONOCYTES # BLD AUTO: 0.2 K/UL (ref 0–1)
MONOCYTES NFR BLD AUTO: 5 %
NEUTROPHILS # BLD AUTO: 1.9 K/UL (ref 1.3–7.7)
NEUTROPHILS NFR BLD AUTO: 52 %
PLATELET # BLD AUTO: 207 K/UL (ref 150–450)
POTASSIUM SERPL-SCNC: 4.3 MMOL/L (ref 3.5–5.1)
PROT SERPL-MCNC: 7.6 G/DL (ref 6.3–8.2)
SODIUM SERPL-SCNC: 139 MMOL/L (ref 137–145)
WBC # BLD AUTO: 3.6 K/UL (ref 3.8–10.6)

## 2018-07-06 PROCEDURE — 36415 COLL VENOUS BLD VENIPUNCTURE: CPT

## 2018-07-06 PROCEDURE — 99284 EMERGENCY DEPT VISIT MOD MDM: CPT

## 2018-07-06 PROCEDURE — 80053 COMPREHEN METABOLIC PANEL: CPT

## 2018-07-06 PROCEDURE — 96374 THER/PROPH/DIAG INJ IV PUSH: CPT

## 2018-07-06 PROCEDURE — 83690 ASSAY OF LIPASE: CPT

## 2018-07-06 PROCEDURE — 74176 CT ABD & PELVIS W/O CONTRAST: CPT

## 2018-07-06 PROCEDURE — 85025 COMPLETE CBC W/AUTO DIFF WBC: CPT

## 2018-07-06 NOTE — ED
General Adult HPI





- General


Chief complaint: Back Pain/Injury


Stated complaint: RT FLANK/KIDNEY PAIN


Source: patient


Mode of arrival: ambulatory


Limitations: language barrier





- History of Present Illness


Initial comments: 





Dictation was produced using dragon dictation software. please excuse any 

grammatical, word or spelling errors. 





Chief Complaint: 62-year-old Thai speaking female presents with right flank 

pain.





History of Present Illness: Non-English speaking Thai female presents with 

right-sided flank pain 2 days.  Patient is accompanied by her son who speaks 

limited English.  Son is the .  He reports the patient has been 

having right-sided flank pain for 2 days.  Denied any urinary symptoms.  No 

nausea or vomiting.  Patient is approximately one bowel movement every 2-3 

days.  She does not drink water frequently.








Past Medical History: Thyroid disorder


Past Surgical History: Cholecystectomy


Social History: [denies alcohol, tobacco or illicit drug use]


Family History: reviewed and noncontributory





The ROS documented in this emergency department record has been reviewed and 

confirmed by me.  Those systems with pertinent positive or negative responses 

have been documented in the HPI.  All other systems are other negative and/or 

noncontributory.





- Related Data


 Home Medications











 Medication  Instructions  Recorded  Confirmed


 


Ibuprofen [Motrin] 400 mg PO Q6HR PRN 07/06/18 07/06/18


 


Levothyroxine Sodium [Synthroid] 175 mcg PO DAILY 07/06/18 07/06/18


 


Methocarbamol [Robaxin] 500 mg PO TID 07/06/18 07/06/18


 


QUEtiapine [SEROquel] 25 mg PO HS 07/06/18 07/06/18








 Previous Rx's











 Medication  Instructions  Recorded


 


Polyethylene Glycol 3350 [Miralax] 17 gm PO DAILY #3 packet 07/06/18











 Allergies











Allergy/AdvReac Type Severity Reaction Status Date / Time


 


No Known Allergies Allergy   Verified 07/06/18 11:10














Review of Systems


ROS Statement: 


Those systems with pertinent positive or pertinent negative responses have been 

documented in the HPI.





ROS Other: All systems not noted in ROS Statement are negative.





Past Medical History


Past Medical History: Thyroid Disorder


Additional Past Medical History / Comment(s): pt does not speak english. 

obtained history from brother-broken english


History of Any Multi-Drug Resistant Organisms: None Reported


Past Surgical History: Cholecystectomy


Past Anesthesia/Blood Transfusion Reactions: No Reported Reaction


Past Psychological History: No Psychological Hx Reported


Smoking Status: Never smoker


Past Alcohol Use History: None Reported


Past Drug Use History: None Reported





- Past Family History


  ** Mother


Family Medical History: No Reported History





  ** Father


Family Medical History: Diabetes Mellitus





General Exam





- General Exam Comments


Initial Comments: 











Vitals: Vital signs upon arrival are within acceptable limits








PHYSICAL EXAM:


General Impression: Alert and oriented x3, not in acute distress


HEENT: Normocephalic atraumatic, extra-ocular movements intact, pupils equal 

and reactive to light bilaterally, mucous membranes moist.


Cardiovascular: Heart regular rate and rhythm, S1&S2 audible, no murmurs, rubs 

or gallops


Chest: Lungs clear to auscultation bilaterally, no rhonchi, no wheeze, no rales


Abdomen: Bowel sounds present, abdomen soft, non-tender, non-distended, no 

organomegaly


Musculoskeletal: Pulses present and equal in all extremities, no peripheral 

edema


Motor: Power 5/5 bilaterally, no focal deficits noted


Neurological: CN II-XII grossly intact, no focal motor or sensory deficits noted


Skin: Intact with no visualized rashes


Psych: Normal affect and mood


Limitations: language barrier





Course


 Vital Signs











  07/06/18





  10:56


 


Temperature 98.7 F


 


Pulse Rate 62


 


Respiratory 18





Rate 


 


Blood Pressure 105/58


 


O2 Sat by Pulse 98





Oximetry 














Medical Decision Making





- Medical Decision Making











ED course: 62-year-old Thai speaking female presents with multiple days of 

right-sided flank pain.  Vital signs upon arrival are stable.  Physical 

examination is benign.  History is limited secondary to communication barrier.  

Offered patient and family member in  however they did not feel that 

she was sick enough to warrant that.  Laboratory evaluation obtained showing no 

acute processes.  Computed tomography scan of the abdomen and pelvis was 

obtained showing no acute processes.  There is findings of stool burden.  

Patient's symptoms consistent with constipation.  Patient given MiraLAX urine 

emergency department.  She is discharged with prescription for MiraLAX.  Told 

to return to the emergency Department with any worsening symptoms.








- Lab Data


Result diagrams: 


 07/06/18 11:31





 07/06/18 11:31


 Lab Results











  07/06/18 07/06/18 Range/Units





  11:31 11:31 


 


WBC  3.6 L   (3.8-10.6)  k/uL


 


RBC  5.01   (3.80-5.40)  m/uL


 


Hgb  11.8   (11.4-16.0)  gm/dL


 


Hct  37.9   (34.0-46.0)  %


 


MCV  75.6 L   (80.0-100.0)  fL


 


MCH  23.5 L   (25.0-35.0)  pg


 


MCHC  31.0   (31.0-37.0)  g/dL


 


RDW  14.9   (11.5-15.5)  %


 


Plt Count  207   (150-450)  k/uL


 


Neutrophils %  52   %


 


Lymphocytes %  34   %


 


Monocytes %  5   %


 


Eosinophils %  4   %


 


Basophils %  1   %


 


Neutrophils #  1.9   (1.3-7.7)  k/uL


 


Lymphocytes #  1.2   (1.0-4.8)  k/uL


 


Monocytes #  0.2   (0-1.0)  k/uL


 


Eosinophils #  0.2   (0-0.7)  k/uL


 


Basophils #  0.0   (0-0.2)  k/uL


 


Hypochromasia  Slight   


 


Microcytosis  Slight   


 


Sodium   139  (137-145)  mmol/L


 


Potassium   4.3  (3.5-5.1)  mmol/L


 


Chloride   104  ()  mmol/L


 


Carbon Dioxide   21 L  (22-30)  mmol/L


 


Anion Gap   14  mmol/L


 


BUN   13  (7-17)  mg/dL


 


Creatinine   0.55  (0.52-1.04)  mg/dL


 


Est GFR (CKD-EPI)AfAm   >90  (>60 ml/min/1.73 sqM)  


 


Est GFR (CKD-EPI)NonAf   >90  (>60 ml/min/1.73 sqM)  


 


Glucose   110 H  (74-99)  mg/dL


 


Calcium   9.0  (8.4-10.2)  mg/dL


 


Total Bilirubin   0.3  (0.2-1.3)  mg/dL


 


AST   29  (14-36)  U/L


 


ALT   41  (9-52)  U/L


 


Alkaline Phosphatase   59  ()  U/L


 


Total Protein   7.6  (6.3-8.2)  g/dL


 


Albumin   4.2  (3.5-5.0)  g/dL


 


Lipase   137  ()  U/L














Disposition


Clinical Impression: 


 Constipation





Disposition: HOME SELF-CARE


Instructions:  Constipation (ED)


Prescriptions: 


Polyethylene Glycol 3350 [Miralax] 17 gm PO DAILY #3 packet


Is patient prescribed a controlled substance at d/c from ED?: No


Referrals: 


Christian Art MD [Primary Care Provider] - 1-2 days


Decision Time: 13:16

## 2018-07-06 NOTE — CT
EXAMINATION TYPE: CT abdomen pelvis wo con

 

DATE OF EXAM: 7/6/2018

 

COMPARISON: 3/12/2018

 

HISTORY: Rt flank pain

 

CT DLP: 597 mGycm

Automated exposure control for dose reduction was used.

 

TECHNIQUE:  Helical acquisition of images was performed from the lung bases through the pelvis.

 

FINDINGS: 

 

LUNG BASES: Subsegmental changes are seen at the lung base most typical of scar or atelectasis.

 

LIVER/GB: Liver appears to be enlarged measuring approximately 25 cm. Gallbladder appears to be absen
t and there is a. Mild central biliary prominence likely related to previous cholecystectomy. Liver w
indow suggests somewhat heterogeneous density to the liver correlate with liver function studies..

 

PANCREAS: No significant abnormality is seen.

 

SPLEEN: No significant abnormality is seen.

 

ADRENALS: No significant abnormality is seen.

 

KIDNEYS: No significant abnormality is seen.

 

ADENOPATHY:  None visualized.

 

OSSEOUS STRUCTURES:  Hypertrophic and degenerative change of the spine noted..

 

BOWEL:  Tiny hiatal hernia. Appendix normal. Bowel gas pattern nonspecific. Respiratory motion does l
imit assessment of the abdomen pelvis including the bowel. There are few prominent fluid-filled small
 bowel loops in the lower abdomen which are nonspecific. No definite inflammatory changes..

 

OTHER: Mild hypertrophic changes of the spine.

 

IMPRESSION: Exam limited by motion artifact.

1. The liver appears to be enlarged measuring 25 cm and is slightly heterogeneous in pattern on liver
 window imaging. Correlate with liver function studies to assess for hepatocellular disease.

2. Nonspecific bowel gas pattern with no diagnostic evidence of obstruction. Few fluid-filled small b
owel loops in the abdomen are noted which are also nonspecific. No inflammatory changes. Mild enterit
is not entirely excluded.

## 2018-07-09 ENCOUNTER — HOSPITAL ENCOUNTER (EMERGENCY)
Dept: HOSPITAL 47 - EC | Age: 63
Discharge: HOME | End: 2018-07-09
Payer: COMMERCIAL

## 2018-07-09 VITALS
DIASTOLIC BLOOD PRESSURE: 70 MMHG | RESPIRATION RATE: 16 BRPM | TEMPERATURE: 99.1 F | SYSTOLIC BLOOD PRESSURE: 119 MMHG | HEART RATE: 96 BPM

## 2018-07-09 DIAGNOSIS — L03.211: Primary | ICD-10-CM

## 2018-07-09 DIAGNOSIS — K02.9: ICD-10-CM

## 2018-07-09 DIAGNOSIS — Z79.899: ICD-10-CM

## 2018-07-09 DIAGNOSIS — E07.9: ICD-10-CM

## 2018-07-09 DIAGNOSIS — K08.409: ICD-10-CM

## 2018-07-09 DIAGNOSIS — Z98.811: ICD-10-CM

## 2018-07-09 DIAGNOSIS — J32.0: ICD-10-CM

## 2018-07-09 PROCEDURE — 99283 EMERGENCY DEPT VISIT LOW MDM: CPT

## 2018-07-09 PROCEDURE — 70486 CT MAXILLOFACIAL W/O DYE: CPT

## 2018-07-09 PROCEDURE — 96372 THER/PROPH/DIAG INJ SC/IM: CPT

## 2018-07-09 NOTE — CT
EXAMINATION TYPE: CT facial bones wo con

 

DATE OF EXAM: 7/9/2018

 

COMPARISON: None

 

HISTORY: No prior, left sided dental pain

 

CT DLP: 673.70 mGycm

Automated exposure control for dose reduction was used.

 

TECHNIQUE: CT scan of the sinuses is performed without contrast, axial images are obtained, coronal r
eformatted images are also reviewed.

 

FINDINGS: There is some mucosal thickening in the left maxillary sinus. There is bilateral patency of
 the ostia renal complex. There is atrophy of the left nasal turbinate. The orbital margins are intac
t. There is no evidence of a blowout fracture. The maxilla is intact. There is intact and zygomatic a
rches. The nasal bone appears intact. There is fairly normal aeration of the frontal ethmoid and sphe
noid sinuses. The mandibular ring is intact. There is no evidence of a fracture. I see no bony destru
ctive process. There is anterior soft tissue swelling on the left side anterior to the left maxilla a
nd left side of the mandible. I see no focal bone destruction. Exam is limited slightly by metal stephania
fact from the dental work.

 

IMPRESSION: No fracture. No evidence of osteomyelitis. Subcutaneous edema and swelling on the left si
de consistent with cellulitis. No abscess seen. Mild left maxillary sinusitis.

## 2018-07-09 NOTE — ED
ENT HPI





- General


Chief complaint: Dental/Oral


Stated complaint: Dental Pain


Time Seen by Provider: 07/09/18 01:33


Source: family, RN notes reviewed


Mode of arrival: ambulatory


Limitations: language barrier





- History of Present Illness


Initial comments: 


This is a 62-year-old female who presents to the emergency department with 

chief complaint of dental pain and left-sided facial swelling.  Patient does 

not speak English so family members translate.  They state that for the past 3 

days patient has been complaining of left upper dental pain and swelling.  They 

state that she has been periodically taking ibuprofen. She has not yet followed 

up with a dentist.  Patient states that her teeth with crowns are painful.  

Denies any drainage.  Denies any fevers.  Denies chest pain or shortness of 

breath, abdominal pain, nausea or vomiting.  She does complain of a generalized 

headache.








- Related Data


 Home Medications











 Medication  Instructions  Recorded  Confirmed


 


Ibuprofen [Motrin] 400 mg PO Q6HR PRN 07/06/18 07/06/18


 


Levothyroxine Sodium [Synthroid] 175 mcg PO DAILY 07/06/18 07/06/18


 


Methocarbamol [Robaxin] 500 mg PO TID 07/06/18 07/06/18


 


QUEtiapine [SEROquel] 25 mg PO HS 07/06/18 07/06/18








 Previous Rx's











 Medication  Instructions  Recorded


 


Polyethylene Glycol 3350 [Miralax] 17 gm PO DAILY #3 packet 07/06/18


 


Ibuprofen 600 mg PO Q6HR #20 tablet 07/09/18


 


Penicillin V Potassium [Pen Vee K] 500 mg PO QID 10 Days  tab 07/09/18











 Allergies











Allergy/AdvReac Type Severity Reaction Status Date / Time


 


No Known Allergies Allergy   Verified 07/09/18 01:16














Review of Systems


ROS Statement: 


Those systems with pertinent positive or pertinent negative responses have been 

documented in the HPI.





ROS Other: All systems not noted in ROS Statement are negative.





Past Medical History


Past Medical History: Thyroid Disorder


Additional Past Medical History / Comment(s): pt does not speak english. 

obtained history from brother-broken english


History of Any Multi-Drug Resistant Organisms: None Reported


Past Surgical History: Cholecystectomy


Past Anesthesia/Blood Transfusion Reactions: No Reported Reaction


Past Psychological History: No Psychological Hx Reported


Smoking Status: Never smoker


Past Alcohol Use History: None Reported


Past Drug Use History: None Reported





- Past Family History


  ** Mother


Family Medical History: No Reported History





  ** Father


Family Medical History: Diabetes Mellitus





General Exam





- General Exam Comments


Initial Comments: 





General: Awake and alert, well-developed; in no apparent distress.


HEENT: Head atraumatic, normocephalic. Pupils are equal, round and reactive to 

light. Extraocular movements intact.  Left-sided cheek swelling is noted with 

tenderness on palpation over the left maxillary sinus.  Oropharynx moist 

without erythema or exudate.  There is tenderness along the left upper gumline.

  Patient has poor dentition throughout with multiple missing teeth and dental 

caries.  No masses or areas of fluctuance are noted.


Neck: Supple. Normal ROM. 


Cardiovascular: Regular rate and rhythm. No murmurs, rubs or gallops. Chest 

symmetrical.  


Respiratory: Lungs clear to auscultation bilaterally. No wheezes, rales or 

rhonchi. Normal respiratory effort with no use of accessory muscles. 


Musculoskeletal: Normal ROM, no tenderness bilateral upper and lower 

extremities.


Skin: Pink, warm and dry without rashes or lesions. 


Neurological: Alert and oriented x3. CN II-XII grossly intact. No focal neuro 

deficits. 


. 











Limitations: language barrier





Course


 Vital Signs











  07/09/18





  01:12


 


Temperature 98.8 F


 


Pulse Rate 90


 


Respiratory 18





Rate 


 


Blood Pressure 121/83


 


O2 Sat by Pulse 98





Oximetry 














Medical Decision Making





- Medical Decision Making





This is a 62-year-old female who presents to the emergency department with 

chief complaint of dental pain and left-sided facial swelling.  Patient does 

not speak English the family members translate.  They state that for the past 3 

days patient has been having left upper dental pain as well as facial swelling.

  This is increased today.  No fevers or chills.  On physical examination, 

there is tenderness along the left upper gumline with no abscesses noted.  

Patient does have poor dentition throughout with multiple recent teeth and 

cavities.  There is left-sided facial swelling noted.  Computed tomography scan 

of the facial bones without contrast was obtained.  This revealed evidence for 

subcutaneous edema and swelling on the left side consistent with cellulitis.  

No abscess seen.  Vital signs are stable and patient is in no acute distress.  

She will be started on penicillin VK and ibuprofen 600 mg.  Strongly advised 

following up with a dentist as soon as possible.  She will be discharged home 

at this time.  All questions were answered.





- Radiology Data


Radiology results: report reviewed, image reviewed


CT facial bones without contrast impression: No fracture.  No evidence of 

osteomyelitis.  Subcutaneous edema and swelling on the left side consistent 

with cellulitis.  No abscess seen.  Mild left maxillary sinusitis.





Disposition


Clinical Impression: 


 Facial cellulitis, Pain, dental





Disposition: HOME SELF-CARE


Condition: Good


Instructions:  Cellulitis (ED), Dental Caries (ED)


Additional Instructions: 


Please follow-up with the dentist as soon as possible.  Please take medications 

as prescribed. Please follow up with primary care provider within 1-2 days. 

Return to emergency department if symptoms should worsen or any concerns arise. 


Please follow up with the [facility] dental clinic.  [facility] [place] [address]. Phone number [phone] for new patients or [phone] for existing patients. 


Prescriptions: 


Ibuprofen 600 mg PO Q6HR #20 tablet


Penicillin V Potassium [Pen Vee K] 500 mg PO QID 10 Days  tab


Is patient prescribed a controlled substance at d/c from ED?: No


Referrals: 


Christian Art MD [Primary Care Provider] - 1-2 days


Time of Disposition: 03:11

## 2018-09-19 ENCOUNTER — HOSPITAL ENCOUNTER (EMERGENCY)
Dept: HOSPITAL 47 - EC | Age: 63
Discharge: HOME | End: 2018-09-19
Payer: COMMERCIAL

## 2018-09-19 VITALS — RESPIRATION RATE: 18 BRPM

## 2018-09-19 VITALS — TEMPERATURE: 97.4 F | HEART RATE: 61 BPM | SYSTOLIC BLOOD PRESSURE: 142 MMHG | DIASTOLIC BLOOD PRESSURE: 77 MMHG

## 2018-09-19 DIAGNOSIS — R11.10: ICD-10-CM

## 2018-09-19 DIAGNOSIS — R42: Primary | ICD-10-CM

## 2018-09-19 DIAGNOSIS — E01.0: ICD-10-CM

## 2018-09-19 DIAGNOSIS — R51: ICD-10-CM

## 2018-09-19 LAB
ALBUMIN SERPL-MCNC: 4.2 G/DL (ref 3.5–5)
ALP SERPL-CCNC: 63 U/L (ref 38–126)
ALT SERPL-CCNC: 31 U/L (ref 9–52)
ANION GAP SERPL CALC-SCNC: 11 MMOL/L
AST SERPL-CCNC: 31 U/L (ref 14–36)
BASOPHILS # BLD AUTO: 0 K/UL (ref 0–0.2)
BASOPHILS NFR BLD AUTO: 1 %
BUN SERPL-SCNC: 13 MG/DL (ref 7–17)
CALCIUM SPEC-MCNC: 9 MG/DL (ref 8.4–10.2)
CHLORIDE SERPL-SCNC: 107 MMOL/L (ref 98–107)
CO2 SERPL-SCNC: 23 MMOL/L (ref 22–30)
EOSINOPHIL # BLD AUTO: 0.1 K/UL (ref 0–0.7)
EOSINOPHIL NFR BLD AUTO: 3 %
ERYTHROCYTE [DISTWIDTH] IN BLOOD BY AUTOMATED COUNT: 4.86 M/UL (ref 3.8–5.4)
ERYTHROCYTE [DISTWIDTH] IN BLOOD: 13.8 % (ref 11.5–15.5)
GLUCOSE SERPL-MCNC: 89 MG/DL (ref 74–99)
HCT VFR BLD AUTO: 37.1 % (ref 34–46)
HGB BLD-MCNC: 11.5 GM/DL (ref 11.4–16)
LYMPHOCYTES # SPEC AUTO: 1.4 K/UL (ref 1–4.8)
LYMPHOCYTES NFR SPEC AUTO: 39 %
MCH RBC QN AUTO: 23.7 PG (ref 25–35)
MCHC RBC AUTO-ENTMCNC: 31.1 G/DL (ref 31–37)
MCV RBC AUTO: 76.3 FL (ref 80–100)
MONOCYTES # BLD AUTO: 0.2 K/UL (ref 0–1)
MONOCYTES NFR BLD AUTO: 5 %
NEUTROPHILS # BLD AUTO: 1.8 K/UL (ref 1.3–7.7)
NEUTROPHILS NFR BLD AUTO: 50 %
PH UR: 5.5 [PH] (ref 5–8)
PLATELET # BLD AUTO: 218 K/UL (ref 150–450)
POTASSIUM SERPL-SCNC: 4.2 MMOL/L (ref 3.5–5.1)
PROT SERPL-MCNC: 7.9 G/DL (ref 6.3–8.2)
RBC UR QL: <1 /HPF (ref 0–5)
SODIUM SERPL-SCNC: 141 MMOL/L (ref 137–145)
SP GR UR: 1.01 (ref 1–1.03)
SQUAMOUS UR QL AUTO: <1 /HPF (ref 0–4)
UROBILINOGEN UR QL STRIP: <2 MG/DL (ref ?–2)
WBC # BLD AUTO: 3.5 K/UL (ref 3.8–10.6)
WBC #/AREA URNS HPF: 1 /HPF (ref 0–5)

## 2018-09-19 PROCEDURE — 70450 CT HEAD/BRAIN W/O DYE: CPT

## 2018-09-19 PROCEDURE — 99285 EMERGENCY DEPT VISIT HI MDM: CPT

## 2018-09-19 PROCEDURE — 85025 COMPLETE CBC W/AUTO DIFF WBC: CPT

## 2018-09-19 PROCEDURE — 93005 ELECTROCARDIOGRAM TRACING: CPT

## 2018-09-19 PROCEDURE — 71046 X-RAY EXAM CHEST 2 VIEWS: CPT

## 2018-09-19 PROCEDURE — 81001 URINALYSIS AUTO W/SCOPE: CPT

## 2018-09-19 PROCEDURE — 96360 HYDRATION IV INFUSION INIT: CPT

## 2018-09-19 PROCEDURE — 36415 COLL VENOUS BLD VENIPUNCTURE: CPT

## 2018-09-19 PROCEDURE — 80053 COMPREHEN METABOLIC PANEL: CPT

## 2018-09-19 NOTE — CT
EXAMINATION TYPE: CT brain wo con

 

DATE OF EXAM: 9/19/2018

 

HISTORY: dizziness

 

CT DLP: 1118 mGycm.  Automated Exposure Control for Dose Reduction was Utilized.

 

TECHNIQUE: CT scan of the head is performed without contrast.

 

COMPARISON: CT brain March 29, 2018.

 

FINDINGS:   There is no acute intracranial hemorrhage or midline shift identified. Ventricles and sul
ci felt within normal limits in size for patient's age.  The globes are intact and the visualized sin
uses are clear.   

 

IMPRESSION:  No acute intracranial hemorrhage or midline shift. No significant change from prior.

## 2018-09-19 NOTE — ED
General Adult HPI





- General


Chief complaint: Dizziness


Stated complaint: dizziness, vomiting


Time Seen by Provider: 09/19/18 15:22


Source: patient, family, , RN notes reviewed, old records reviewed


Mode of arrival: ambulatory


Limitations: language barrier





- History of Present Illness


Initial comments: 





63-year-old female presenting with dizziness and lightheadedness.  History is 

difficult secondary to language barrier, history obtained from the patient's 

brother who is at bedside.  He is able to translate.  Patient has had 2 days of 

dizziness with several episodes of vomiting.  Denies chest pain.  Denies 

abdominal pain.  Denies diarrhea.  Denies fever.  Denies any focal numbness or 

weakness.  Patient has had some intermittent headaches, she has no headache at 

the time my evaluation.  She has previous history of thyroid disease.  She is 

currently out of her thyroid medication, she is not following with a primary 

care physician secondary to insurance issues.





- Related Data


 Home Medications











 Medication  Instructions  Recorded  Confirmed


 


Naproxen Sodium [Aleve] 440 mg PO Q12HR PRN 09/19/18 09/19/18








 Previous Rx's











 Medication  Instructions  Recorded


 


Levothyroxine Sodium [Levo-T] 175 mcg PO DAILY #90 tablet 09/19/18


 


Meclizine [Antivert] 25 mg PO TID PRN #21 tab 09/19/18











 Allergies











Allergy/AdvReac Type Severity Reaction Status Date / Time


 


No Known Allergies Allergy   Verified 09/19/18 15:52














Review of Systems


ROS Statement: 


Those systems with pertinent positive or pertinent negative responses have been 

documented in the HPI.





ROS Other: All systems not noted in ROS Statement are negative.





Past Medical History


Past Medical History: Thyroid Disorder


Additional Past Medical History / Comment(s): pt does not speak english. 

obtained history from brother-broken english


History of Any Multi-Drug Resistant Organisms: None Reported


Past Surgical History: Cholecystectomy


Past Anesthesia/Blood Transfusion Reactions: No Reported Reaction


Past Psychological History: No Psychological Hx Reported


Smoking Status: Never smoker


Past Alcohol Use History: None Reported


Past Drug Use History: None Reported





- Past Family History


  ** Mother


Family Medical History: No Reported History





  ** Father


Family Medical History: Diabetes Mellitus





General Exam


Limitations: language barrier


General appearance: alert, in no apparent distress


Head exam: Present: atraumatic, normocephalic


Eye exam: Present: normal appearance, PERRL, EOMI.  Absent: nystagmus


ENT exam: Present: normal exam


Neck exam: Present: normal inspection, thyromegaly.  Absent: tenderness, 

meningismus


Respiratory exam: Present: normal lung sounds bilaterally.  Absent: respiratory 

distress, wheezes


Cardiovascular Exam: Present: regular rate, normal rhythm


GI/Abdominal exam: Present: soft.  Absent: distended, tenderness, guarding


Neurological exam: Present: alert, CN II-XII intact, other (Nonfocal neurologic 

exam, no ataxia).  Absent: motor sensory deficit


Skin exam: Present: warm, dry, intact.  Absent: cyanosis, diaphoretic





Course


 Vital Signs











  09/19/18 09/19/18





  14:50 15:51


 


Temperature 98.5 F 


 


Pulse Rate 62 56 L


 


Respiratory 20 18





Rate  


 


Blood Pressure 106/70 106/68


 


O2 Sat by Pulse 98 98





Oximetry  














EKG Findings





- EKG Comments:


EKG Findings:: EKG: Sinus bradycardia, rate of 54, IN interval 198, QRS 

duration 88, , , no ST segment elevation





Medical Decision Making





- Medical Decision Making





63-year-old female presenting with symptoms concerning for vertigo.  Patient 

did have complaint of some intermittent headaches, head CT is obtained, this is 

negative for cranial hemorrhage or mass effect.  CBC and CMP are within normal 

limits, urinalysis negative.  Patient given meclizine in the emergency 

department.  On reevaluation she is feeling better.  No episodes nausea and 

vomiting.  Vital signs remained stable.  Patient's family member who is at 

bedside does request refill of her thyroid medication and she is currently 

having insurance issues with her primary care physician.  They are currently 

seeking a new primary care physician.  She will be given referral.  Thyroid 

prescription is refilled.





- Lab Data


Result diagrams: 


 09/19/18 15:50





 09/19/18 15:50


 Lab Results











  09/19/18 09/19/18 09/19/18 Range/Units





  15:50 15:50 16:47 


 


WBC  3.5 L    (3.8-10.6)  k/uL


 


RBC  4.86    (3.80-5.40)  m/uL


 


Hgb  11.5    (11.4-16.0)  gm/dL


 


Hct  37.1    (34.0-46.0)  %


 


MCV  76.3 L    (80.0-100.0)  fL


 


MCH  23.7 L    (25.0-35.0)  pg


 


MCHC  31.1    (31.0-37.0)  g/dL


 


RDW  13.8    (11.5-15.5)  %


 


Plt Count  218    (150-450)  k/uL


 


Neutrophils %  50    %


 


Lymphocytes %  39    %


 


Monocytes %  5    %


 


Eosinophils %  3    %


 


Basophils %  1    %


 


Neutrophils #  1.8    (1.3-7.7)  k/uL


 


Lymphocytes #  1.4    (1.0-4.8)  k/uL


 


Monocytes #  0.2    (0-1.0)  k/uL


 


Eosinophils #  0.1    (0-0.7)  k/uL


 


Basophils #  0.0    (0-0.2)  k/uL


 


Hypochromasia  Moderate    


 


Sodium   141   (137-145)  mmol/L


 


Potassium   4.2   (3.5-5.1)  mmol/L


 


Chloride   107   ()  mmol/L


 


Carbon Dioxide   23   (22-30)  mmol/L


 


Anion Gap   11   mmol/L


 


BUN   13   (7-17)  mg/dL


 


Creatinine   0.56   (0.52-1.04)  mg/dL


 


Est GFR (CKD-EPI)AfAm   >90   (>60 ml/min/1.73 sqM)  


 


Est GFR (CKD-EPI)NonAf   >90   (>60 ml/min/1.73 sqM)  


 


Glucose   89   (74-99)  mg/dL


 


Calcium   9.0   (8.4-10.2)  mg/dL


 


Total Bilirubin   0.3   (0.2-1.3)  mg/dL


 


AST   31   (14-36)  U/L


 


ALT   31   (9-52)  U/L


 


Alkaline Phosphatase   63   ()  U/L


 


Total Protein   7.9   (6.3-8.2)  g/dL


 


Albumin   4.2   (3.5-5.0)  g/dL


 


Urine Color    Yellow  


 


Urine Appearance    Clear  (Clear)  


 


Urine pH    5.5  (5.0-8.0)  


 


Ur Specific Gravity    1.014  (1.001-1.035)  


 


Urine Protein    Negative  (Negative)  


 


Urine Glucose (UA)    Negative  (Negative)  


 


Urine Ketones    Negative  (Negative)  


 


Urine Blood    Trace H  (Negative)  


 


Urine Nitrite    Negative  (Negative)  


 


Urine Bilirubin    Negative  (Negative)  


 


Urine Urobilinogen    <2.0  (<2.0)  mg/dL


 


Ur Leukocyte Esterase    Negative  (Negative)  


 


Urine RBC    <1  (0-5)  /hpf


 


Urine WBC    1  (0-5)  /hpf


 


Ur Squamous Epith Cells    <1  (0-4)  /hpf


 


Urine Mucus    Rare H  (None)  /hpf














Disposition


Clinical Impression: 


 Vertigo





Disposition: HOME SELF-CARE


Condition: Good


Instructions:  Dizziness (ED)


Prescriptions: 


Levothyroxine Sodium [Levo-T] 175 mcg PO DAILY #90 tablet


Meclizine [Antivert] 25 mg PO TID PRN #21 tab


 PRN Reason: Vertigo


Is patient prescribed a controlled substance at d/c from ED?: No


Referrals: 


Christian Art MD [Primary Care Provider] - 1-2 days


Ruchi Partida MD [STAFF PHYSICIAN] - 1-2 days


Time of Disposition: 17:24

## 2018-09-19 NOTE — XR
EXAMINATION TYPE: XR chest 2V

 

DATE OF EXAM: 9/19/2018

 

COMPARISON: 12/31/2016

 

HISTORY: Dizziness. Nausea and vomiting.

 

TECHNIQUE:  Frontal and lateral views of the chest are obtained.

 

FINDINGS:  Heart and mediastinum are normal. Lungs are clear. Diaphragm is normal. There are chest le
ads. Bony thorax is intact.

 

IMPRESSION:  Normal chest. No adverse change compared to old exam.

## 2019-09-15 ENCOUNTER — HOSPITAL ENCOUNTER (EMERGENCY)
Dept: HOSPITAL 47 - EC | Age: 64
Discharge: HOME | End: 2019-09-15
Payer: COMMERCIAL

## 2019-09-15 VITALS
SYSTOLIC BLOOD PRESSURE: 149 MMHG | TEMPERATURE: 99.7 F | RESPIRATION RATE: 16 BRPM | DIASTOLIC BLOOD PRESSURE: 78 MMHG | HEART RATE: 76 BPM

## 2019-09-15 DIAGNOSIS — J02.9: ICD-10-CM

## 2019-09-15 DIAGNOSIS — K04.7: Primary | ICD-10-CM

## 2019-09-15 PROCEDURE — 87081 CULTURE SCREEN ONLY: CPT

## 2019-09-15 PROCEDURE — 87430 STREP A AG IA: CPT

## 2019-09-15 PROCEDURE — 99283 EMERGENCY DEPT VISIT LOW MDM: CPT

## 2019-09-15 NOTE — ED
ENT HPI





- General


Chief complaint: ENT


Stated complaint: throat, dental pain


Time Seen by Provider: 09/15/19 12:17


Source: patient, family, RN notes reviewed, old records reviewed


Mode of arrival: ambulatory


Limitations: no limitations, language barrier





- History of Present Illness


Initial comments: 





Patient is a 64-year-old female presents emergency department today her female 

family member.  Chief complaint of a sore throat for 5 days, fever, also 

complaining of dental pain for the past 2 days.  Patient's family member reports

he is had a minor cough but has not had any productivity to the cough.  Patient 

has had Motrin last night no medicines today.  She complains of no significant 

headache or visual disturbances.  She denies any history of ALLERGIES to 

medications.





- Related Data


                                Home Medications











 Medication  Instructions  Recorded  Confirmed


 


Naproxen Sodium [Aleve] 440 mg PO Q12HR PRN 09/19/18 09/19/18








                                  Previous Rx's











 Medication  Instructions  Recorded


 


Levothyroxine Sodium [Levo-T] 175 mcg PO DAILY #90 tablet 09/19/18


 


Meclizine [Antivert] 25 mg PO TID PRN #21 tab 09/19/18


 


Amoxicillin 500 mg PO Q8H #21 capsule 09/15/19











                                    Allergies











Allergy/AdvReac Type Severity Reaction Status Date / Time


 


No Known Allergies Allergy   Verified 09/15/19 12:11














Review of Systems


ROS Statement: 


Those systems with pertinent positive or pertinent negative responses have been 

documented in the HPI.





ROS Other: All systems not noted in ROS Statement are negative.





Past Medical History


Past Medical History: Thyroid Disorder


Additional Past Medical History / Comment(s): pt does not speak english. 

obtained history from brother-broken english


History of Any Multi-Drug Resistant Organisms: None Reported


Past Surgical History: Cholecystectomy


Past Anesthesia/Blood Transfusion Reactions: No Reported Reaction


Past Psychological History: No Psychological Hx Reported


Smoking Status: Never smoker


Past Alcohol Use History: None Reported


Past Drug Use History: None Reported





- Past Family History


  ** Mother


Family Medical History: No Reported History





  ** Father


Family Medical History: Diabetes Mellitus





General Exam





- General Exam Comments


Initial Comments: 





Pleasant 64-year-old female.  Patient does not speak English.  History obtained 

from family female family members with her.


Limitations: no limitations, language barrier


General appearance: alert, in no apparent distress


Head exam: Present: atraumatic, normocephalic, normal inspection


Eye exam: Present: normal appearance, PERRL, EOMI.  Absent: scleral icterus, 

conjunctival injection, periorbital swelling


ENT exam: Present: normal exam, mucous membranes moist, other (Has irritation 

over tooth #14.).  Absent: normal oropharynx (Erythematous oropharynx.)


Neck exam: Present: normal inspection.  Absent: tenderness, meningismus, 

lymphadenopathy


Respiratory exam: Present: normal lung sounds bilaterally.  Absent: respiratory 

distress, wheezes, rales, rhonchi, stridor


Cardiovascular Exam: Present: regular rate, normal rhythm, normal heart sounds. 

 Absent: systolic murmur, diastolic murmur, rubs, gallop, clicks


GI/Abdominal exam: Present: soft, normal bowel sounds.  Absent: distended, 

tenderness, guarding, rebound, rigid


Back exam: Present: normal inspection


Neurological exam: Present: alert


Psychiatric exam: Present: normal affect, normal mood


Skin exam: Present: warm, dry, intact, normal color.  Absent: rash





Course





                                   Vital Signs











  09/15/19





  12:11


 


Temperature 99.7 F H


 


Pulse Rate 76


 


Respiratory 16





Rate 


 


Blood Pressure 149/78


 


O2 Sat by Pulse 97





Oximetry 














Medical Decision Making





- Medical Decision Making





64-year-old female, presents today with a low-grade temperature, sore throat for

 the past 5 days as well as dental pain.  Patient has evidence of dental caries,

 and abscess over tooth #14.  Patient rapid strep was completed.  She does have 

erythematous tonsils.  Evidence of tender adenopathy.  Patient was given Motrin 

and Tylenol, by mouth Decadron for throat swelling and pain as well as started 

on amoxicillin for concern for dental infection.  Patient will have close 

follow-up with primary care doctor and her dentist.  Discussed return 

parameters.  All questions answered.





Disposition


Clinical Impression: 


 Pain, dental, Pharyngitis





Disposition: HOME SELF-CARE


Condition: Good


Instructions (If sedation given, give patient instructions):  Pharyngitis (ED)


Additional Instructions: 


Please use medication as discussed. Please follow up with family doctor if 

symptoms have not improved over the next two days. Please return to the 

emergency room if your symptoms increase or worsen or for any other concerns.


Prescriptions: 


Amoxicillin 500 mg PO Q8H #21 capsule


Is patient prescribed a controlled substance at d/c from ED?: No


Referrals: 


Adam Torres MD [Primary Care Provider] - 1-2 days


Time of Disposition: 12:36